# Patient Record
Sex: FEMALE | Race: ASIAN | NOT HISPANIC OR LATINO | Employment: OTHER | URBAN - METROPOLITAN AREA
[De-identification: names, ages, dates, MRNs, and addresses within clinical notes are randomized per-mention and may not be internally consistent; named-entity substitution may affect disease eponyms.]

---

## 2023-06-15 ENCOUNTER — OFFICE VISIT (OUTPATIENT)
Dept: OPTOMETRY | Facility: CLINIC | Age: 68
End: 2023-06-15

## 2023-06-15 DIAGNOSIS — H52.4 PRESBYOPIA: ICD-10-CM

## 2023-06-15 DIAGNOSIS — H43.812 PVD (POSTERIOR VITREOUS DETACHMENT), LEFT EYE: ICD-10-CM

## 2023-06-15 DIAGNOSIS — H52.13 MYOPIA, BILATERAL: ICD-10-CM

## 2023-06-15 DIAGNOSIS — H26.9 CORTICAL CATARACT OF BOTH EYES: Primary | ICD-10-CM

## 2023-06-15 PROCEDURE — 99204 PR OFFICE/OUTPT VISIT, NEW, LEVL IV, 45-59 MIN: ICD-10-PCS | Mod: S$PBB,,, | Performed by: OPTOMETRIST

## 2023-06-15 PROCEDURE — 99999 PR PBB SHADOW E&M-NEW PATIENT-LVL II: CPT | Mod: PBBFAC,,, | Performed by: OPTOMETRIST

## 2023-06-15 PROCEDURE — 99202 OFFICE O/P NEW SF 15 MIN: CPT | Mod: PBBFAC,PO | Performed by: OPTOMETRIST

## 2023-06-15 PROCEDURE — 92015 PR REFRACTION: ICD-10-PCS | Mod: ,,, | Performed by: OPTOMETRIST

## 2023-06-15 PROCEDURE — 92015 DETERMINE REFRACTIVE STATE: CPT | Mod: ,,, | Performed by: OPTOMETRIST

## 2023-06-15 PROCEDURE — 99999 PR PBB SHADOW E&M-NEW PATIENT-LVL II: ICD-10-PCS | Mod: PBBFAC,,, | Performed by: OPTOMETRIST

## 2023-06-15 PROCEDURE — 99204 OFFICE O/P NEW MOD 45 MIN: CPT | Mod: S$PBB,,, | Performed by: OPTOMETRIST

## 2023-06-15 NOTE — PROGRESS NOTES
HPI    DLE: 9/22   [facility] #[id number]    No eyedrops  No eye surgery     PFamHx:  (+)glauc-mother  (-)RD  (-) mac degen  (-)blindness    Pt here for routine eye exam.  Pt states floaters OU.  Pt without visual   complaints today OU . Pt denies flashes, headaches or eye pain OU.  Pt   denies itching, tearing or burning OU. Pt is here with her sister.  Pt is   visiting from [place].  Pt speaks Chinese Mandarin.   Last edited by Cici Herron MA on 6/15/2023 10:34 AM.            Assessment /Plan     For exam results, see Encounter Report.    Cortical cataract of both eyes   Mild, monitor    PVD (posterior vitreous detachment), left eye   Stable, monitor    Presbyopia  Myopia, bilateral   Rx specs    RTC 1 year